# Patient Record
Sex: FEMALE | Race: WHITE | NOT HISPANIC OR LATINO | URBAN - METROPOLITAN AREA
[De-identification: names, ages, dates, MRNs, and addresses within clinical notes are randomized per-mention and may not be internally consistent; named-entity substitution may affect disease eponyms.]

---

## 2019-01-02 ENCOUNTER — EMERGENCY (EMERGENCY)
Facility: HOSPITAL | Age: 84
LOS: 1 days | Discharge: HOME | End: 2019-01-02
Attending: EMERGENCY MEDICINE
Payer: MEDICARE

## 2019-01-02 VITALS
RESPIRATION RATE: 20 BRPM | SYSTOLIC BLOOD PRESSURE: 191 MMHG | DIASTOLIC BLOOD PRESSURE: 71 MMHG | WEIGHT: 136.91 LBS | OXYGEN SATURATION: 97 % | TEMPERATURE: 98 F | HEART RATE: 61 BPM

## 2019-01-02 DIAGNOSIS — Z98.89 OTHER SPECIFIED POSTPROCEDURAL STATES: Chronic | ICD-10-CM

## 2019-01-02 DIAGNOSIS — Z90.710 ACQUIRED ABSENCE OF BOTH CERVIX AND UTERUS: Chronic | ICD-10-CM

## 2019-01-02 DIAGNOSIS — Z95.1 PRESENCE OF AORTOCORONARY BYPASS GRAFT: Chronic | ICD-10-CM

## 2019-01-02 PROCEDURE — 70450 CT HEAD/BRAIN W/O DYE: CPT | Mod: 26

## 2019-01-02 PROCEDURE — 99284 EMERGENCY DEPT VISIT MOD MDM: CPT | Mod: 25

## 2019-01-02 PROCEDURE — 99284 EMERGENCY DEPT VISIT MOD MDM: CPT

## 2019-01-02 PROCEDURE — 70450 CT HEAD/BRAIN W/O DYE: CPT

## 2019-01-02 NOTE — ED PROVIDER NOTE - MEDICAL DECISION MAKING DETAILS
take home bp meds ambulating in nad return to ed for intractable HA, persistent vomiting, or new onset motor/sensory deficits

## 2019-01-02 NOTE — ED ADULT TRIAGE NOTE - CHIEF COMPLAINT QUOTE
"I was carrying in shopping bags and I tripped and fell. " Pt is on a baby ASA, has swelling and bruising to right forehead. Pt states she feels nauseous. Pt to be evaluated for priority CT "I was carrying in shopping bags and I tripped and fell. " Pt is on a baby ASA, has swelling and bruising to right forehead. denies LOC, no other obvious signs of trauma noted Pt states she feels nauseous. Pt to be evaluated for priority CT vs trauma

## 2019-01-02 NOTE — ED PROVIDER NOTE - OBJECTIVE STATEMENT
83 y/o F pt with hx of CAD, CABG x3, DM, HTN, hypothyroidism, appendectomy, and hysterectomy presents to ED c/o swelling and bruising to her forehead s/p fall that occurred today. Pt was carrying grocery bags when she tripped and fell, hitting her head on the floor. Pt takes ASA 81 mg daily. denies LOC. denies fever. denies HA or neck pain. no chest pain or sob. no abd pain. no n/v/d. no urinary f/u/d. no back pain. no motor or sensory deficits. denies illicit drug use. no recent travel. no rash. no other acute issues symptoms or concerns

## 2019-01-02 NOTE — ED STATDOCS - PROGRESS NOTE DETAILS
85 y/o F pt presents to the ED c/o head pain and knee pain s/p falling today.  Pt was walking holding shopping bags when she tripped and fell down to the ground, hitting her head.  Pt has a bruise/bump on her head and reports R knee pain.  Pt takes baby ASA daily.  Denies LOC, dizziness, N/V.  Exam: hematoma to L forehead with abrasion  Pt will be sent to the Main ED for further treatment and evaluation. Initial orders placed. Priority CT ordered.

## 2020-10-31 ENCOUNTER — TRANSCRIPTION ENCOUNTER (OUTPATIENT)
Age: 85
End: 2020-10-31

## 2020-12-08 ENCOUNTER — APPOINTMENT (OUTPATIENT)
Dept: DERMATOLOGY | Facility: CLINIC | Age: 85
End: 2020-12-08
Payer: MEDICARE

## 2020-12-08 DIAGNOSIS — L82.0 INFLAMED SEBORRHEIC KERATOSIS: ICD-10-CM

## 2020-12-08 PROCEDURE — 17110 DESTRUCTION B9 LES UP TO 14: CPT

## 2020-12-08 RX ORDER — AMOXICILLIN AND CLAVULANATE POTASSIUM 875; 125 MG/1; MG/1
875-125 TABLET, COATED ORAL
Qty: 20 | Refills: 0 | Status: DISCONTINUED | COMMUNITY
Start: 2020-07-13

## 2020-12-08 NOTE — HISTORY OF PRESENT ILLNESS
[FreeTextEntry1] : Growth on left forehead [de-identified] : First visit for 86-year-old white female presenting with a tender lesion on the left upper lateral forehead. No history of skin cancer.

## 2020-12-08 NOTE — PHYSICAL EXAM
[Alert] : alert [Oriented x 3] : ~L oriented x 3 [Well Nourished] : well nourished [FreeTextEntry3] : Type I skin\par \par Patient wearing a facemask\par \par Patient declines full skin exam today\par \par Left upper lateral forehead: 12 x 12 mm pink/tan verrucous plaque

## 2020-12-13 ENCOUNTER — TRANSCRIPTION ENCOUNTER (OUTPATIENT)
Age: 85
End: 2020-12-13

## 2020-12-15 ENCOUNTER — NON-APPOINTMENT (OUTPATIENT)
Age: 85
End: 2020-12-15

## 2020-12-15 LAB — CORE LAB BIOPSY: NORMAL

## 2021-01-07 ENCOUNTER — APPOINTMENT (OUTPATIENT)
Dept: DERMATOLOGY | Facility: CLINIC | Age: 86
End: 2021-01-07
Payer: MEDICARE

## 2021-01-07 DIAGNOSIS — Z85.828 PERSONAL HISTORY OF OTHER MALIGNANT NEOPLASM OF SKIN: ICD-10-CM

## 2021-01-07 DIAGNOSIS — C44.41 BASAL CELL CARCINOMA OF SKIN OF SCALP AND NECK: ICD-10-CM

## 2021-01-07 PROCEDURE — 17272 DSTR MAL LES S/N/H/F/G 1.1-2: CPT

## 2021-01-07 PROCEDURE — 99072 ADDL SUPL MATRL&STAF TM PHE: CPT

## 2021-01-07 RX ORDER — ASPIRIN 81 MG
81 TABLET, DELAYED RELEASE (ENTERIC COATED) ORAL
Refills: 0 | Status: ACTIVE | COMMUNITY

## 2021-01-13 ENCOUNTER — NON-APPOINTMENT (OUTPATIENT)
Age: 86
End: 2021-01-13

## 2021-01-13 LAB — CORE LAB BIOPSY: NORMAL

## 2021-01-13 NOTE — PHYSICAL EXAM
[FreeTextEntry3] : Patient wearing a facemask\par \par Left upper neck: 14 x 6 mm bilobed focally crusted translucent plaque

## 2021-01-13 NOTE — HISTORY OF PRESENT ILLNESS
[FreeTextEntry1] : Lesion on left neck [de-identified] : Followup visit for 86-year-old white female seen by me on December 8, 2020, for evaluation of a lesion on the left neck. No history of skin cancer.

## 2022-12-04 ENCOUNTER — OFFICE (OUTPATIENT)
Dept: URBAN - METROPOLITAN AREA CLINIC 12 | Facility: CLINIC | Age: 87
Setting detail: OPHTHALMOLOGY
End: 2022-12-04
Payer: MEDICARE

## 2022-12-04 DIAGNOSIS — Z96.1: ICD-10-CM

## 2022-12-04 DIAGNOSIS — E11.9: ICD-10-CM

## 2022-12-04 DIAGNOSIS — H40.013: ICD-10-CM

## 2022-12-04 DIAGNOSIS — H43.393: ICD-10-CM

## 2022-12-04 DIAGNOSIS — H35.3131: ICD-10-CM

## 2022-12-04 DIAGNOSIS — H26.493: ICD-10-CM

## 2022-12-04 DIAGNOSIS — H04.123: ICD-10-CM

## 2022-12-04 PROCEDURE — 92083 EXTENDED VISUAL FIELD XM: CPT | Performed by: OPHTHALMOLOGY

## 2022-12-04 PROCEDURE — 92134 CPTRZ OPH DX IMG PST SGM RTA: CPT | Performed by: OPHTHALMOLOGY

## 2022-12-04 PROCEDURE — 99213 OFFICE O/P EST LOW 20 MIN: CPT | Performed by: OPHTHALMOLOGY

## 2022-12-04 ASSESSMENT — REFRACTION_AUTOREFRACTION
OS_CYLINDER: -1.00
OD_CYLINDER: -0.25
OS_AXIS: 103
OS_SPHERE: -0.25
OD_AXIS: 051
OD_SPHERE: +0.25

## 2022-12-04 ASSESSMENT — REFRACTION_MANIFEST
OS_AXIS: 105
OS_VA1: 20/25
OD_CYLINDER: -0.25
OS_SPHERE: -0.25
OD_VA1: 20/20-1
OD_AXIS: 050
OS_CYLINDER: -1.00
OD_SPHERE: +0.25

## 2022-12-04 ASSESSMENT — AXIALLENGTH_DERIVED
OD_AL: 23.8209
OS_AL: 24.1255
OS_AL: 24.1255
OD_AL: 23.8209

## 2022-12-04 ASSESSMENT — REFRACTION_CURRENTRX
OD_VPRISM_DIRECTION: SV
OD_SPHERE: +2.50
OS_SPHERE: +2.50
OD_OVR_VA: 20/
OS_VPRISM_DIRECTION: SV
OS_OVR_VA: 20/

## 2022-12-04 ASSESSMENT — TEAR BREAK UP TIME (TBUT)
OD_TBUT: 1+
OS_TBUT: T

## 2022-12-04 ASSESSMENT — VISUAL ACUITY
OD_BCVA: 20/40-1
OS_BCVA: 20/20-2

## 2022-12-04 ASSESSMENT — SPHEQUIV_DERIVED
OS_SPHEQUIV: -0.75
OS_SPHEQUIV: -0.75
OD_SPHEQUIV: 0.125
OD_SPHEQUIV: 0.125

## 2022-12-04 ASSESSMENT — KERATOMETRY
OS_K2POWER_DIOPTERS: 43.25
OD_K1POWER_DIOPTERS: 42.50
METHOD_AUTO_MANUAL: AUTO
OS_AXISANGLE_DEGREES: 025
OS_K1POWER_DIOPTERS: 42.50
OD_AXISANGLE_DEGREES: 099
OD_K2POWER_DIOPTERS: 43.00

## 2022-12-04 ASSESSMENT — TONOMETRY
OS_IOP_MMHG: 17
OD_IOP_MMHG: 16

## 2022-12-04 ASSESSMENT — DECREASING TEAR LAKE - SEVERITY SCORE
OS_DEC_TEARLAKE: T
OD_DEC_TEARLAKE: T

## 2022-12-04 ASSESSMENT — LID POSITION - COMMENTS
OD_COMMENTS: INCISION INTACT
OS_COMMENTS: INCISION INTACT

## 2022-12-04 ASSESSMENT — CONFRONTATIONAL VISUAL FIELD TEST (CVF)
OS_FINDINGS: FULL
OD_FINDINGS: FULL

## 2022-12-30 ENCOUNTER — APPOINTMENT (OUTPATIENT)
Dept: GASTROENTEROLOGY | Facility: CLINIC | Age: 87
End: 2022-12-30

## 2023-02-20 ENCOUNTER — EMERGENCY (EMERGENCY)
Facility: HOSPITAL | Age: 88
LOS: 1 days | Discharge: HOME | End: 2023-02-20
Attending: EMERGENCY MEDICINE
Payer: MEDICARE

## 2023-02-20 VITALS
TEMPERATURE: 98 F | DIASTOLIC BLOOD PRESSURE: 77 MMHG | RESPIRATION RATE: 20 BRPM | HEART RATE: 61 BPM | OXYGEN SATURATION: 96 % | WEIGHT: 126.1 LBS | HEIGHT: 62 IN | SYSTOLIC BLOOD PRESSURE: 181 MMHG

## 2023-02-20 DIAGNOSIS — Z95.1 PRESENCE OF AORTOCORONARY BYPASS GRAFT: Chronic | ICD-10-CM

## 2023-02-20 DIAGNOSIS — Z90.710 ACQUIRED ABSENCE OF BOTH CERVIX AND UTERUS: Chronic | ICD-10-CM

## 2023-02-20 DIAGNOSIS — Z98.89 OTHER SPECIFIED POSTPROCEDURAL STATES: Chronic | ICD-10-CM

## 2023-02-20 PROCEDURE — 99284 EMERGENCY DEPT VISIT MOD MDM: CPT

## 2023-02-20 PROCEDURE — 99283 EMERGENCY DEPT VISIT LOW MDM: CPT

## 2023-02-20 PROCEDURE — 96372 THER/PROPH/DIAG INJ SC/IM: CPT

## 2023-02-20 RX ORDER — KETOROLAC TROMETHAMINE 30 MG/ML
30 SYRINGE (ML) INJECTION ONCE
Refills: 0 | Status: DISCONTINUED | OUTPATIENT
Start: 2023-02-20 | End: 2023-02-20

## 2023-02-20 RX ORDER — ACETAMINOPHEN 500 MG
975 TABLET ORAL ONCE
Refills: 0 | Status: COMPLETED | OUTPATIENT
Start: 2023-02-20 | End: 2023-02-20

## 2023-02-20 RX ORDER — LIDOCAINE 4 G/100G
1 CREAM TOPICAL ONCE
Refills: 0 | Status: COMPLETED | OUTPATIENT
Start: 2023-02-20 | End: 2023-02-20

## 2023-02-20 RX ADMIN — Medication 30 MILLIGRAM(S): at 10:55

## 2023-02-20 RX ADMIN — Medication 975 MILLIGRAM(S): at 10:55

## 2023-02-20 RX ADMIN — LIDOCAINE 1 PATCH: 4 CREAM TOPICAL at 10:55

## 2023-02-20 NOTE — ED STATDOCS - CLINICAL SUMMARY MEDICAL DECISION MAKING FREE TEXT BOX
Pt presenting for knee pain, likely bursitis. Less likely gout or septic arthritis give absents of infectious symptoms on history or exam. Will control pain and wrap.

## 2023-02-20 NOTE — ED ADULT NURSE NOTE - OBJECTIVE STATEMENT
pt a+ox3, reports right knee pain worsening over past week. pt reports difficulty ambulating. denies any recent falls or trauma.

## 2023-02-20 NOTE — ED PROVIDER NOTE - PATIENT PORTAL LINK FT
You can access the FollowMyHealth Patient Portal offered by [facility] by registering at the following website: [email or web address]. By joining [employer]’s FollowMyHealth portal, you will also be able to view your health information using other applications (apps) compatible with our system.

## 2023-02-20 NOTE — ED PROVIDER NOTE - PHYSICAL EXAMINATION
General: well appearing, NAD  Head: NC, AT  EENT: EOMI, no scleral icterus  Cardiac: RRR, no apparent murmurs, no lower extremity edema  Respiratory: CTABL, no respiratory distress   Abdomen: soft, ND, NT, nonperitonitic  MSK/Vascular: full ROM, soft compartments, warm extremities, swelling inferior and lateral to patella, no redness, mildly tender, middle rom without pain but ROM at extremes reduced 2/2 pain.  Neuro: AAOx3, sensation to light touch intact  Psych: calm, cooperative

## 2023-02-20 NOTE — ED STATDOCS - OBJECTIVE STATEMENT
88 y f with pmh of gout, CAD sp cabg, htn, hld, COPD, hypothyroidism presenting for knee pain for 1 week. No trauma. Pt able to walk but needs assistance. Does not feel like gout flare and is usually in toes. Reduced ROM 2/2 pain at extremes. Pt denies f/c, n/c, ab pain, cp, sob, dysuria.

## 2023-02-20 NOTE — ED STATDOCS - NS ED ROS FT
Constitutional: no fever, no chills  Head: NC, AT   Eyes: no redness   ENMT: no nasal congestion/drainage, no sore throat   CV: no chest pain, no edema  Resp: no cough, no dyspnea  GI: no abdominal pain, no nausea, no vomiting, no diarrhea  : no dysuria, no hematuria   Skin: no lesions, no rashes   Neuro: no LOC, no headache, no sensory deficits, no weakness  MSK: Right knee pain

## 2023-02-20 NOTE — ED ADULT NURSE NOTE - NSICDXFAMILYHX_GEN_ALL_CORE_FT
FAMILY HISTORY:  Sibling  Still living? Unknown  Family history of myocardial infarction, Age at diagnosis: Age Unknown

## 2023-02-20 NOTE — ED ADULT NURSE NOTE - NSICDXPASTSURGICALHX_GEN_ALL_CORE_FT
PAST SURGICAL HISTORY:  S/P appendectomy     S/P hysterectomy     S/P three vessel coronary artery bypass

## 2023-02-20 NOTE — ED PROVIDER NOTE - NSFOLLOWUPINSTRUCTIONS_ED_ALL_ED_FT
Take tylenol and ibuprofen for the pain.  Follow up with the orthopedic surgeon provided.  Come back for fever, chills, worsening pain, redness or increased swelling at knee.    Bursitis       Bursitis is inflammation and irritation of a bursa, which is one of the small, fluid-filled sacs that cushion and protect the moving parts of your body. These sacs are located between bones and muscles, bones and muscle attachments, or bones and skin areas that are next to bones. A bursa protects those structures from the wear and tear that results from frequent movement.    An inflamed bursa causes pain and swelling. Fluid may build up inside the sac. Bursitis is most common near joints, especially the knees, elbows, hips, and shoulders.      What are the causes?    This condition may be caused by:•Injury from:  •A direct hit (blow), like falling on your knee or elbow.      •Overuse of a joint (repetitive stress).        •Infection. This can happen if bacteria get into a bursa through a cut or scrape near a joint.      •Diseases that cause joint inflammation, such as gout and rheumatoid arthritis.        What increases the risk?    You are more likely to develop this condition if you:  •Have a job or hobby that involves a lot of repetitive stress on your joints.      •Have a condition that weakens your body's defense system (immune system), such as diabetes, cancer, or HIV.    •Do any of these often:  •Lift and reach overhead.      •Kneel or lean on hard surfaces.      •Run or walk.          What are the signs or symptoms?    The most common symptoms of this condition include:  •Pain that gets worse when you move the affected body part or use it to support (bear) your body weight.      •Inflammation.      •Stiffness.      Other symptoms include:  •Redness.      •Swelling.      •Tenderness.      •Warmth.      •Pain that continues after rest.      •Fever or chills. These may occur in bursitis that is caused by infection.        How is this diagnosed?    This condition may be diagnosed based on:  •Your medical history and a physical exam.      •Imaging tests, such as an MRI.      •A procedure to drain fluid from the bursa with a needle (aspiration). The fluid may be checked for signs of infection or gout.      •Blood tests to rule out other causes of inflammation.        How is this treated?    This condition can usually be treated at home with rest, ice, applying pressure (compression), and raising the body part that is affected (elevation). This is called RICE therapy. For mild bursitis, RICE therapy may be all you need. Other treatments may include:  •NSAIDs to treat pain and inflammation.      •Corticosteroid medicines to fight inflammation. These medicines may be injected into and around the area of bursitis.      •Aspiration of fluid from the bursa to relieve pain and improve movement.      •Antibiotic medicine to treat an infected bursa.      •A splint, brace, or walking aid, such as a cane.      •Physical therapy if you continue to have pain or limited movement.      •Surgery to remove a damaged or infected bursa. This may be needed if other treatments have not worked.        Follow these instructions at home:    Medicines     •Take over-the-counter and prescription medicines only as told by your health care provider.      •If you were prescribed an antibiotic medicine, take it as told by your health care provider. Do not stop taking the antibiotic even if you start to feel better.        General instructions    •Rest the affected area as told by your health care provider.  •If possible, raise (elevate) the affected area above the level of your heart while you are sitting or lying down.      •Avoid activities that make pain worse.        •Use splints, braces, pads, or walking aids as told by your health care provider.    •If directed, put ice on the affected area:  •If you have a removable splint or brace, remove it as told by your health care provider.      •Put ice in a plastic bag.      •Place a towel between your skin and the bag or between your splint or brace and the bag.      •Leave the ice on for 20 minutes, 2–3 times a day.        •Keep all follow-up visits as told by your health care provider. This is important.      Preventing future episodes     Take actions to help prevent future episodes of bursitis.  •Wear knee pads if you kneel often.      •Wear sturdy running or walking shoes that fit you well.      •Take breaks regularly from repetitive activity.      •Warm up by stretching before doing any activity that takes a lot of effort.      •Maintain a healthy weight or lose weight as recommended by your health care provider. If you need help doing this, ask your health care provider.      •Exercise regularly. Start any new physical activity gradually.        Contact a health care provider if you:    •Have a fever.      •Have chills.      •Have bursitis that is not getting better with treatment or home care.        Summary    •Bursitis is inflammation and irritation of a bursa, which is one of the small, fluid-filled sacs that cushion and protect the moving parts of your body.      •An inflamed bursa causes pain and swelling.      •Bursitis is commonly diagnosed with a physical exam, but other tests are sometimes needed.      •This condition can usually be treated at home with rest, ice, applying pressure (compression), and raising the body part that is affected (elevation). This is called RICE therapy.      This information is not intended to replace advice given to you by your health care provider. Make sure you discuss any questions you have with your health care provider.

## 2023-02-20 NOTE — ED ADULT NURSE NOTE - NSICDXPASTMEDICALHX_GEN_ALL_CORE_FT
PAST MEDICAL HISTORY:  CAD (coronary artery disease)     Diabetes     Hypertension     Hypothyroid

## 2023-02-20 NOTE — ED PROVIDER NOTE - ATTENDING CONTRIBUTION TO CARE
88-year-old female; with past medical history significant for CAD, hypertension, hyperlipidemia,; now presenting with right knee pain and swelling x10 days.  Denies fevers chills or sweats.  Denies calf pain.  Denies chest pain shortness of breath or palpitations.  Denies trauma.  Denies excessive activity.  Exam:  General:     NAD  Head:     NC/AT, EOMI,  Neck:     trachea midline  Lungs:     CTA b/l, no w/r/r  CVS:     S1S2, RRR, no m/g/r  Ext:    L LE: from/nt @ hip/knee/ankle.  R LE:  from/nt @ hip/ankle. ttp @ infero-lateral knee over bursa, small subpatellar effusion.    A/P: 88-year-old female now presenting with atraumatic right knee pain.  -xray, pain control, compression, f/up with ortho

## 2023-02-20 NOTE — ED PROVIDER NOTE - CLINICAL SUMMARY MEDICAL DECISION MAKING FREE TEXT BOX
Pt presenting for knee pain, likely bursitis. Less likely gout or septic arthritis give absents of infectious symptoms on history or exam. Will control pain and wrap.    Pt reassessed after meds and ace bandage. Pain is greatly improved and pt able to walk without assistance at pt's baseline. Offered cane. Pt says that she has one at home. Encouraged to use it to avoid fall and worse injury.

## 2023-11-13 ENCOUNTER — EMERGENCY (EMERGENCY)
Facility: HOSPITAL | Age: 88
LOS: 1 days | Discharge: HOME | End: 2023-11-13
Attending: EMERGENCY MEDICINE
Payer: MEDICARE

## 2023-11-13 VITALS
TEMPERATURE: 98 F | SYSTOLIC BLOOD PRESSURE: 177 MMHG | DIASTOLIC BLOOD PRESSURE: 67 MMHG | WEIGHT: 124.12 LBS | HEIGHT: 60 IN | RESPIRATION RATE: 20 BRPM | OXYGEN SATURATION: 98 % | HEART RATE: 66 BPM

## 2023-11-13 DIAGNOSIS — Z90.710 ACQUIRED ABSENCE OF BOTH CERVIX AND UTERUS: Chronic | ICD-10-CM

## 2023-11-13 DIAGNOSIS — Z95.1 PRESENCE OF AORTOCORONARY BYPASS GRAFT: Chronic | ICD-10-CM

## 2023-11-13 DIAGNOSIS — Z98.89 OTHER SPECIFIED POSTPROCEDURAL STATES: Chronic | ICD-10-CM

## 2023-11-13 PROCEDURE — 99284 EMERGENCY DEPT VISIT MOD MDM: CPT | Mod: 25

## 2023-11-13 PROCEDURE — 99284 EMERGENCY DEPT VISIT MOD MDM: CPT

## 2023-11-13 PROCEDURE — 73630 X-RAY EXAM OF FOOT: CPT

## 2023-11-13 PROCEDURE — 73610 X-RAY EXAM OF ANKLE: CPT

## 2023-11-13 PROCEDURE — 29505 APPLICATION LONG LEG SPLINT: CPT | Mod: RT

## 2023-11-13 PROCEDURE — 73630 X-RAY EXAM OF FOOT: CPT | Mod: 26,RT

## 2023-11-13 PROCEDURE — 73610 X-RAY EXAM OF ANKLE: CPT | Mod: 26,RT

## 2023-11-13 NOTE — ED PROVIDER NOTE - CLINICAL SUMMARY MEDICAL DECISION MAKING FREE TEXT BOX
89-year-old female presents to the ED complaining of right ankle/foot pain that she woke up with this morning. Patient x-rays reviewed–no acute fractures or dislocations.  There was a questionable lucency at the fifth metatarsal head but clinically patient has absolutely no tenderness to palpation over that region.  Tenderness is localized to the lateral malleolus.  Likely sprain, patient placed in ankle Velcro splint and given orthopedic follow-up.  Stable for discharge.

## 2023-11-13 NOTE — ED PROVIDER NOTE - PATIENT PORTAL LINK FT
You can access the FollowMyHealth Patient Portal offered by [facility] by registering at the following website: [email or web address]. By joining [employer]’s FollowMyHealth portal, you will also be able to view your health information using other applications (apps) compatible with our system.

## 2023-11-13 NOTE — ED PROVIDER NOTE - MUSCULOSKELETAL, MLM
+ TTP of the right lateral malleolus/forefoot with mild localized swelling noted. 2+ DP pulse. SILT. +elh/fhl/plantar/dorsiflexion intact. No calf TTP. + pain elicited with ankle passive inversion.

## 2023-11-13 NOTE — ED PROVIDER NOTE - CARE PROVIDER_API CALL
Emigdio Isaac  Orthopaedic Surgery  [address] [place] [id number]  Phone: [phone]  Fax: [phone]  Follow Up Time:

## 2023-11-13 NOTE — ED PROVIDER NOTE - OBJECTIVE STATEMENT
89-year-old female presents to the ED complaining of right ankle/foot pain that she woke up with this morning.  Patient states that she had a "burning/tingling "sensation of her ankle. Pt otherwise denies recent trauma/injury, fever/chills, c/p, sob, abd pain, n/v/c/d, dysuria, numbness/tingling/weakness and has no other complaints at this time.

## 2023-11-13 NOTE — ED PROVIDER NOTE - NS ED ATTENDING STATEMENT MOD
This was a shared visit with the HORACIO. I reviewed and verified the documentation and independently performed the documented:

## 2023-11-13 NOTE — ED ADULT NURSE NOTE - OBJECTIVE STATEMENT
pt rpeorts to the ED c/o of R foot numbness that started this morning. pt a&ox3 denies trama or injury tot he foot state she just woke up and it felt numb. RR wnl.

## 2023-11-13 NOTE — ED PROVIDER NOTE - ATTENDING APP SHARED VISIT CONTRIBUTION OF CARE
89 F complaining of right ankle pain, no history of trauma.  On exam mild tenderness over the volar aspect anterior lateral, no significant swelling or redness neurovascular intact.  X-rays show no evidence of acute abnormality.  Fitted in surgical shoe outpatient follow-up.  Likely ligamental injury.

## 2023-12-16 ENCOUNTER — OFFICE (OUTPATIENT)
Dept: URBAN - METROPOLITAN AREA CLINIC 12 | Facility: CLINIC | Age: 88
Setting detail: OPHTHALMOLOGY
End: 2023-12-16
Payer: MEDICARE

## 2023-12-16 DIAGNOSIS — H40.013: ICD-10-CM

## 2023-12-16 DIAGNOSIS — H43.393: ICD-10-CM

## 2023-12-16 DIAGNOSIS — H35.3131: ICD-10-CM

## 2023-12-16 DIAGNOSIS — E11.9: ICD-10-CM

## 2023-12-16 PROBLEM — H16.223 DRY EYE SYNDROME K SICCA; BOTH EYES: Status: ACTIVE | Noted: 2023-12-16

## 2023-12-16 PROCEDURE — 92014 COMPRE OPH EXAM EST PT 1/>: CPT | Performed by: OPHTHALMOLOGY

## 2023-12-16 PROCEDURE — 92250 FUNDUS PHOTOGRAPHY W/I&R: CPT | Performed by: OPHTHALMOLOGY

## 2023-12-16 ASSESSMENT — DECREASING TEAR LAKE - SEVERITY SCORE
OS_DEC_TEARLAKE: T
OD_DEC_TEARLAKE: T

## 2023-12-16 ASSESSMENT — REFRACTION_MANIFEST
OS_AXIS: 125
OS_CYLINDER: -1.00
OS_VA1: 20/25
OD_AXIS: 050
OD_VA1: 20/NI
OD_VA1: 20/20-1
OD_SPHERE: +0.25
OS_SPHERE: -1.00
OD_CYLINDER: -0.25
OS_AXIS: 105
OD_AXIS: 025
OS_CYLINDER: -0.50
OS_VA1: 20/40-2
OS_SPHERE: -0.25
OD_CYLINDER: -0.25
OD_SPHERE: +0.25

## 2023-12-16 ASSESSMENT — REFRACTION_AUTOREFRACTION
OS_AXIS: 127
OS_SPHERE: -1.00
OD_AXIS: 023
OD_CYLINDER: -0.25
OS_CYLINDER: -0.50
OD_SPHERE: +0.25

## 2023-12-16 ASSESSMENT — REFRACTION_CURRENTRX
OS_SPHERE: +2.50
OS_VPRISM_DIRECTION: SV
OD_OVR_VA: 20/
OD_VPRISM_DIRECTION: SV
OS_OVR_VA: 20/
OD_SPHERE: +2.50

## 2023-12-16 ASSESSMENT — SPHEQUIV_DERIVED
OD_SPHEQUIV: 0.125
OS_SPHEQUIV: -1.25
OD_SPHEQUIV: 0.125
OS_SPHEQUIV: -1.25
OD_SPHEQUIV: 0.125
OS_SPHEQUIV: -0.75

## 2023-12-16 ASSESSMENT — LID POSITION - COMMENTS
OD_COMMENTS: INCISION INTACT
OS_COMMENTS: INCISION INTACT

## 2023-12-16 ASSESSMENT — CONFRONTATIONAL VISUAL FIELD TEST (CVF)
OS_FINDINGS: FULL
OD_FINDINGS: FULL

## 2023-12-16 ASSESSMENT — TEAR BREAK UP TIME (TBUT)
OS_TBUT: T
OD_TBUT: 1+

## 2024-01-23 ENCOUNTER — EMERGENCY (EMERGENCY)
Facility: HOSPITAL | Age: 89
LOS: 1 days | Discharge: HOME | End: 2024-01-23
Attending: STUDENT IN AN ORGANIZED HEALTH CARE EDUCATION/TRAINING PROGRAM
Payer: MEDICARE

## 2024-01-23 VITALS
OXYGEN SATURATION: 96 % | DIASTOLIC BLOOD PRESSURE: 66 MMHG | RESPIRATION RATE: 18 BRPM | HEART RATE: 59 BPM | WEIGHT: 119.93 LBS | HEIGHT: 60 IN | TEMPERATURE: 98 F | SYSTOLIC BLOOD PRESSURE: 190 MMHG

## 2024-01-23 VITALS — SYSTOLIC BLOOD PRESSURE: 160 MMHG | DIASTOLIC BLOOD PRESSURE: 70 MMHG

## 2024-01-23 DIAGNOSIS — Z98.89 OTHER SPECIFIED POSTPROCEDURAL STATES: Chronic | ICD-10-CM

## 2024-01-23 DIAGNOSIS — Z90.710 ACQUIRED ABSENCE OF BOTH CERVIX AND UTERUS: Chronic | ICD-10-CM

## 2024-01-23 DIAGNOSIS — Z95.1 PRESENCE OF AORTOCORONARY BYPASS GRAFT: Chronic | ICD-10-CM

## 2024-01-23 LAB
ALBUMIN SERPL ELPH-MCNC: 3.9 G/DL — SIGNIFICANT CHANGE UP (ref 3.3–5.2)
ALP SERPL-CCNC: 61 U/L — SIGNIFICANT CHANGE UP (ref 40–120)
ALT FLD-CCNC: 12 U/L — SIGNIFICANT CHANGE UP
ANION GAP SERPL CALC-SCNC: 13 MMOL/L — SIGNIFICANT CHANGE UP (ref 5–17)
APTT BLD: 29.1 SEC — SIGNIFICANT CHANGE UP (ref 24.5–35.6)
AST SERPL-CCNC: 20 U/L — SIGNIFICANT CHANGE UP
BASOPHILS # BLD AUTO: 0.05 K/UL — SIGNIFICANT CHANGE UP (ref 0–0.2)
BASOPHILS NFR BLD AUTO: 0.6 % — SIGNIFICANT CHANGE UP (ref 0–2)
BILIRUB SERPL-MCNC: 0.3 MG/DL — LOW (ref 0.4–2)
BUN SERPL-MCNC: 34.3 MG/DL — HIGH (ref 8–20)
CALCIUM SERPL-MCNC: 9.7 MG/DL — SIGNIFICANT CHANGE UP (ref 8.4–10.5)
CHLORIDE SERPL-SCNC: 104 MMOL/L — SIGNIFICANT CHANGE UP (ref 96–108)
CO2 SERPL-SCNC: 21 MMOL/L — LOW (ref 22–29)
CREAT SERPL-MCNC: 1.59 MG/DL — HIGH (ref 0.5–1.3)
EGFR: 31 ML/MIN/1.73M2 — LOW
EOSINOPHIL # BLD AUTO: 0.91 K/UL — HIGH (ref 0–0.5)
EOSINOPHIL NFR BLD AUTO: 10.8 % — HIGH (ref 0–6)
GLUCOSE SERPL-MCNC: 104 MG/DL — HIGH (ref 70–99)
HCT VFR BLD CALC: 35.8 % — SIGNIFICANT CHANGE UP (ref 34.5–45)
HGB BLD-MCNC: 12 G/DL — SIGNIFICANT CHANGE UP (ref 11.5–15.5)
IMM GRANULOCYTES NFR BLD AUTO: 0.1 % — SIGNIFICANT CHANGE UP (ref 0–0.9)
INR BLD: 0.93 RATIO — SIGNIFICANT CHANGE UP (ref 0.85–1.18)
LYMPHOCYTES # BLD AUTO: 2.53 K/UL — SIGNIFICANT CHANGE UP (ref 1–3.3)
LYMPHOCYTES # BLD AUTO: 29.9 % — SIGNIFICANT CHANGE UP (ref 13–44)
MCHC RBC-ENTMCNC: 31.1 PG — SIGNIFICANT CHANGE UP (ref 27–34)
MCHC RBC-ENTMCNC: 33.5 GM/DL — SIGNIFICANT CHANGE UP (ref 32–36)
MCV RBC AUTO: 92.7 FL — SIGNIFICANT CHANGE UP (ref 80–100)
MONOCYTES # BLD AUTO: 0.77 K/UL — SIGNIFICANT CHANGE UP (ref 0–0.9)
MONOCYTES NFR BLD AUTO: 9.1 % — SIGNIFICANT CHANGE UP (ref 2–14)
NEUTROPHILS # BLD AUTO: 4.18 K/UL — SIGNIFICANT CHANGE UP (ref 1.8–7.4)
NEUTROPHILS NFR BLD AUTO: 49.5 % — SIGNIFICANT CHANGE UP (ref 43–77)
PLATELET # BLD AUTO: 277 K/UL — SIGNIFICANT CHANGE UP (ref 150–400)
POTASSIUM SERPL-MCNC: 4.1 MMOL/L — SIGNIFICANT CHANGE UP (ref 3.5–5.3)
POTASSIUM SERPL-SCNC: 4.1 MMOL/L — SIGNIFICANT CHANGE UP (ref 3.5–5.3)
PROT SERPL-MCNC: 6.4 G/DL — LOW (ref 6.6–8.7)
PROTHROM AB SERPL-ACNC: 10.3 SEC — SIGNIFICANT CHANGE UP (ref 9.5–13)
RBC # BLD: 3.86 M/UL — SIGNIFICANT CHANGE UP (ref 3.8–5.2)
RBC # FLD: 13.3 % — SIGNIFICANT CHANGE UP (ref 10.3–14.5)
SODIUM SERPL-SCNC: 138 MMOL/L — SIGNIFICANT CHANGE UP (ref 135–145)
TROPONIN T, HIGH SENSITIVITY RESULT: 16 NG/L — SIGNIFICANT CHANGE UP (ref 0–51)
TROPONIN T, HIGH SENSITIVITY RESULT: 17 NG/L — SIGNIFICANT CHANGE UP (ref 0–51)
WBC # BLD: 8.45 K/UL — SIGNIFICANT CHANGE UP (ref 3.8–10.5)
WBC # FLD AUTO: 8.45 K/UL — SIGNIFICANT CHANGE UP (ref 3.8–10.5)

## 2024-01-23 PROCEDURE — 85730 THROMBOPLASTIN TIME PARTIAL: CPT

## 2024-01-23 PROCEDURE — 84484 ASSAY OF TROPONIN QUANT: CPT

## 2024-01-23 PROCEDURE — 99285 EMERGENCY DEPT VISIT HI MDM: CPT

## 2024-01-23 PROCEDURE — 80053 COMPREHEN METABOLIC PANEL: CPT

## 2024-01-23 PROCEDURE — 85610 PROTHROMBIN TIME: CPT

## 2024-01-23 PROCEDURE — 85025 COMPLETE CBC W/AUTO DIFF WBC: CPT

## 2024-01-23 PROCEDURE — 93010 ELECTROCARDIOGRAM REPORT: CPT

## 2024-01-23 PROCEDURE — 99285 EMERGENCY DEPT VISIT HI MDM: CPT | Mod: 25

## 2024-01-23 PROCEDURE — 71045 X-RAY EXAM CHEST 1 VIEW: CPT | Mod: 26

## 2024-01-23 PROCEDURE — 36415 COLL VENOUS BLD VENIPUNCTURE: CPT

## 2024-01-23 PROCEDURE — 71045 X-RAY EXAM CHEST 1 VIEW: CPT

## 2024-01-23 PROCEDURE — 93005 ELECTROCARDIOGRAM TRACING: CPT

## 2024-01-23 NOTE — ED PROVIDER NOTE - PATIENT PORTAL LINK FT
You can access the FollowMyHealth Patient Portal offered by [facility] by registering at the following website: [email or web address]. By joining "[employer]’s FollowMyHealth portal, you will also be able to view your health information using other applications (apps) compatible with our system.

## 2024-01-23 NOTE — ED ADULT NURSE NOTE - OBJECTIVE STATEMENT
pt A & Ox4 c/o episode of chest pain that radiated into neck. Respirations even and unlabored. Denies chest pain or SOB. Pt reports chest pain began this afternoon and she told her son who called ambulance. Pt reports triple bypass 30 years ago. denies complaints at this time. CM and  ongoing. IV established. Blood specimens sent to lab. Medications administered as ordered.

## 2024-01-23 NOTE — ED PROVIDER NOTE - NSICDXPASTMEDICALHX_GEN_ALL_CORE_FT
PAST MEDICAL HISTORY:  CAD (coronary artery disease)     Diabetes     Gout     Hypertension     Hypothyroid

## 2024-01-23 NOTE — ED ADULT NURSE NOTE - NSFALLUNIVINTERV_ED_ALL_ED
Bed/Stretcher in lowest position, wheels locked, appropriate side rails in place/Call bell, personal items and telephone in reach/Instruct patient to call for assistance before getting out of bed/chair/stretcher/Non-slip footwear applied when patient is off stretcher/[place] to call system/Physically safe environment - no spills, clutter or unnecessary equipment/Purposeful proactive rounding/Room/bathroom lighting operational, light cord in reach

## 2024-01-23 NOTE — ED PROVIDER NOTE - OBJECTIVE STATEMENT
88 y/o F pt w/ PMHx of CAD s/p CABG presenting w/ mild intermittent chest pain x4 days. Not related to exertion, occasionally wakes her from sleep. L sided and radiates to neck. She denies SOB, back pain, numbness, tingling, focal weakness, abd pain, N/V/D, or any other complaints.

## 2024-01-23 NOTE — ED PROVIDER NOTE - NSFOLLOWUPINSTRUCTIONS_ED_ALL_ED_FT
-Please follow-up with your primary care doctor in the next 2 days.  Please call tomorrow for an appointment.  If you cannot follow-up with your primary care doctor please return to the ED for any urgent issues.  - You were given a copy of the tests performed today.  Please bring the results with you and review them with your primary care doctor.  - If you have any worsening of symptoms or any other concerns please return to the ED immediately.  - Please continue taking your home medications as directed.    Chest Pain    Chest pain can be caused by many different conditions which may or may not be dangerous. Causes include heartburn, lung infections, heart attack, blood clot in lungs, skin infections, strain or damage to muscle, cartilage, or bones, etc. In addition to a history and physical examination, an electrocardiogram (ECG) or other lab tests may have been performed to determine the cause of your chest pain. Follow up with your primary care provider or with a cardiologist as instructed.     SEEK IMMEDIATE MEDICAL CARE IF YOU HAVE ANY OF THE FOLLOWING SYMPTOMS: worsening chest pain, coughing up blood, unexplained back/neck/jaw pain, severe abdominal pain, dizziness or lightheadedness, fainting, shortness of breath, sweaty or clammy skin, vomiting, or racing heart beat. These symptoms may represent a serious problem that is an emergency. Do not wait to see if the symptoms will go away. Get medical help right away. Call 911 and do not drive yourself to the hospital. 1) Follow up with your heart doctor in 1-2 days  2) Return to the ER for worsening or concerning symptoms    Nonspecific Chest Pain, Adult    Chest pain can be caused by many different conditions. It can be caused by a condition that is life-threatening and requires treatment right away. It can also be caused by something that is not life-threatening. If you have chest pain, it can be hard to know the difference, so it is important to get help right away to make sure that you do not have a serious condition.    Some life-threatening causes of chest pain include:    Heart attack.  A tear in the body's main blood vessel (aortic dissection).  Inflammation around your heart (pericarditis).  A problem in the lungs, such as a blood clot (pulmonary embolism) or a collapsed lung (pneumothorax).    Some non life-threatening causes of chest pain include:    Heartburn.  Anxiety or stress.  Damage to the bones, muscles, and cartilage that make up your chest wall.  Pneumonia or bronchitis.  Shingles infection (varicella-zoster virus).    Chest pain can feel like:    Pain or discomfort on the surface of your chest or deep in your chest.  Crushing, pressure, aching, or squeezing pain.  Burning or tingling.  Dull or sharp pain that is worse when you move, cough, or take a deep breath.  Pain or discomfort that is also felt in your back, neck, jaw, shoulder, or arm, or pain that spreads to any of these areas.    Your chest pain may come and go. It may also be constant. Your health care provider will do lab tests and other studies to find the cause of your pain. Treatment will depend on the cause of your chest pain.    Follow these instructions at home:      Medicines    Take over-the-counter and prescription medicines only as told by your health care provider.  If you were prescribed an antibiotic, take it as told by your health care provider. Do not stop taking the antibiotic even if you start to feel better.        Lifestyle     Rest as directed by your health care provider.  Do not use any products that contain nicotine or tobacco, such as cigarettes and e-cigarettes. If you need help quitting, ask your health care provider.  Do not drink alcohol.  Make healthy lifestyle choices as recommended. These may include:    Getting regular exercise. Ask your health care provider to suggest some activities that are safe for you.  Eating a heart-healthy diet. This includes plenty of fresh fruits and vegetables, whole grains, low-fat (lean) protein, and low-fat dairy products. A dietitian can help you find healthy eating options.  Maintaining a healthy weight.  Managing any other health conditions you have, such as high blood pressure (hypertension) or diabetes.  Reducing stress, such as with yoga or relaxation techniques.        General instructions    Pay attention to any changes in your symptoms. Tell your health care provider about them or any new symptoms.  Avoid any activities that cause chest pain.  Keep all follow-up visits as told by your health care provider. This is important. This includes visits for any further testing if your chest pain does not go away.    Contact a health care provider if:  Your chest pain does not go away.  You feel depressed.  You have a fever.    Get help right away if:  Your chest pain gets worse.  You have a cough that gets worse, or you cough up blood.  You have severe pain in your abdomen.  You faint.  You have sudden, unexplained chest discomfort.  You have sudden, unexplained discomfort in your arms, back, neck, or jaw.  You have shortness of breath at any time.  You suddenly start to sweat, or your skin gets clammy.  You feel nausea or you vomit.  You suddenly feel lightheaded or dizzy.  You have severe weakness, or unexplained weakness or fatigue.  Your heart begins to beat quickly, or it feels like it is skipping beats.    These symptoms may represent a serious problem that is an emergency. Do not wait to see if the symptoms will go away. Get medical help right away. Call your local emergency services (911 in the [place] Do not drive yourself to the hospital.    Summary  Chest pain can be caused by a condition that is serious and requires urgent treatment. It may also be caused by something that is not life-threatening.  If you have chest pain, it is very important to see your health care provider. Your health care provider may do lab tests and other studies to find the cause of your pain.  Follow your health care provider's instructions on taking medicines, making lifestyle changes, and getting emergency treatment if symptoms become worse.  Keep all follow-up visits as told by your health care provider. This includes visits for any further testing if your chest pain does not go away.    ADDITIONAL NOTES AND INSTRUCTIONS    Please follow up with your Primary MD in 24-48 hr.  Seek immediate medical care for any new/worsening signs or symptoms.

## 2024-01-23 NOTE — ED PROVIDER NOTE - ATTENDING CONTRIBUTION TO CARE
89 yo female with PMHX of CAD, HTN, DM presents for evaluation of left upper chest pain radiating into into the left lateral neck region intermittently for the past 3-4 days. Patient notices pain at rest primarily and symptoms last several seconds to a minute of so. There is no pattern to the pain and patient denies any additional symptoms( exertional dyspnea/chest pain, dyspnea, nausea, vomiting, back pain, dizziness, near/syncope, limb pain/tenderness). Patient f/u with Dr. garcia( [facility]) and states recent work-up has been negative.      I, Zainab Hayden, personally saw the patient with the resident, and completed the key components of the history and physical exam. I then discussed the management plan with the resident.

## 2024-02-22 ENCOUNTER — EMERGENCY (EMERGENCY)
Facility: HOSPITAL | Age: 89
LOS: 1 days | Discharge: HOME | End: 2024-02-22
Attending: EMERGENCY MEDICINE
Payer: MEDICARE

## 2024-02-22 VITALS
RESPIRATION RATE: 20 BRPM | HEART RATE: 69 BPM | TEMPERATURE: 98 F | OXYGEN SATURATION: 98 % | SYSTOLIC BLOOD PRESSURE: 197 MMHG | DIASTOLIC BLOOD PRESSURE: 65 MMHG | WEIGHT: 121.7 LBS | HEIGHT: 60 IN

## 2024-02-22 VITALS — TEMPERATURE: 98 F

## 2024-02-22 DIAGNOSIS — Z95.1 PRESENCE OF AORTOCORONARY BYPASS GRAFT: Chronic | ICD-10-CM

## 2024-02-22 DIAGNOSIS — Z98.89 OTHER SPECIFIED POSTPROCEDURAL STATES: Chronic | ICD-10-CM

## 2024-02-22 DIAGNOSIS — Z90.710 ACQUIRED ABSENCE OF BOTH CERVIX AND UTERUS: Chronic | ICD-10-CM

## 2024-02-22 PROBLEM — M10.9 GOUT, UNSPECIFIED: Chronic | Status: ACTIVE | Noted: 2024-01-24

## 2024-02-22 LAB
ALBUMIN SERPL ELPH-MCNC: 4 G/DL — SIGNIFICANT CHANGE UP (ref 3.3–5.2)
ALP SERPL-CCNC: 70 U/L — SIGNIFICANT CHANGE UP (ref 40–120)
ALT FLD-CCNC: 10 U/L — SIGNIFICANT CHANGE UP
ANION GAP SERPL CALC-SCNC: 14 MMOL/L — SIGNIFICANT CHANGE UP (ref 5–17)
APPEARANCE UR: ABNORMAL
AST SERPL-CCNC: 17 U/L — SIGNIFICANT CHANGE UP
BACTERIA # UR AUTO: ABNORMAL /HPF
BASOPHILS # BLD AUTO: 0.07 K/UL — SIGNIFICANT CHANGE UP (ref 0–0.2)
BASOPHILS NFR BLD AUTO: 0.6 % — SIGNIFICANT CHANGE UP (ref 0–2)
BILIRUB SERPL-MCNC: 0.3 MG/DL — LOW (ref 0.4–2)
BILIRUB UR-MCNC: NEGATIVE — SIGNIFICANT CHANGE UP
BUN SERPL-MCNC: 34.3 MG/DL — HIGH (ref 8–20)
CALCIUM SERPL-MCNC: 10.3 MG/DL — SIGNIFICANT CHANGE UP (ref 8.4–10.5)
CAST: 2 /LPF — SIGNIFICANT CHANGE UP (ref 0–4)
CHLORIDE SERPL-SCNC: 101 MMOL/L — SIGNIFICANT CHANGE UP (ref 96–108)
CO2 SERPL-SCNC: 23 MMOL/L — SIGNIFICANT CHANGE UP (ref 22–29)
COLOR SPEC: YELLOW — SIGNIFICANT CHANGE UP
CREAT SERPL-MCNC: 1.47 MG/DL — HIGH (ref 0.5–1.3)
DIFF PNL FLD: ABNORMAL
EGFR: 34 ML/MIN/1.73M2 — LOW
EOSINOPHIL # BLD AUTO: 1.21 K/UL — HIGH (ref 0–0.5)
EOSINOPHIL NFR BLD AUTO: 11.1 % — HIGH (ref 0–6)
GLUCOSE SERPL-MCNC: 87 MG/DL — SIGNIFICANT CHANGE UP (ref 70–99)
GLUCOSE UR QL: NEGATIVE MG/DL — SIGNIFICANT CHANGE UP
HCT VFR BLD CALC: 39.2 % — SIGNIFICANT CHANGE UP (ref 34.5–45)
HGB BLD-MCNC: 13.1 G/DL — SIGNIFICANT CHANGE UP (ref 11.5–15.5)
IMM GRANULOCYTES NFR BLD AUTO: 0.3 % — SIGNIFICANT CHANGE UP (ref 0–0.9)
KETONES UR-MCNC: NEGATIVE MG/DL — SIGNIFICANT CHANGE UP
LEUKOCYTE ESTERASE UR-ACNC: ABNORMAL
LYMPHOCYTES # BLD AUTO: 3.86 K/UL — HIGH (ref 1–3.3)
LYMPHOCYTES # BLD AUTO: 35.4 % — SIGNIFICANT CHANGE UP (ref 13–44)
MCHC RBC-ENTMCNC: 30.3 PG — SIGNIFICANT CHANGE UP (ref 27–34)
MCHC RBC-ENTMCNC: 33.4 GM/DL — SIGNIFICANT CHANGE UP (ref 32–36)
MCV RBC AUTO: 90.7 FL — SIGNIFICANT CHANGE UP (ref 80–100)
MONOCYTES # BLD AUTO: 0.75 K/UL — SIGNIFICANT CHANGE UP (ref 0–0.9)
MONOCYTES NFR BLD AUTO: 6.9 % — SIGNIFICANT CHANGE UP (ref 2–14)
NEUTROPHILS # BLD AUTO: 4.99 K/UL — SIGNIFICANT CHANGE UP (ref 1.8–7.4)
NEUTROPHILS NFR BLD AUTO: 45.7 % — SIGNIFICANT CHANGE UP (ref 43–77)
NITRITE UR-MCNC: NEGATIVE — SIGNIFICANT CHANGE UP
PH UR: 6.5 — SIGNIFICANT CHANGE UP (ref 5–8)
PLATELET # BLD AUTO: 312 K/UL — SIGNIFICANT CHANGE UP (ref 150–400)
POTASSIUM SERPL-MCNC: 4.1 MMOL/L — SIGNIFICANT CHANGE UP (ref 3.5–5.3)
POTASSIUM SERPL-SCNC: 4.1 MMOL/L — SIGNIFICANT CHANGE UP (ref 3.5–5.3)
PROT SERPL-MCNC: 6.9 G/DL — SIGNIFICANT CHANGE UP (ref 6.6–8.7)
PROT UR-MCNC: 30 MG/DL
RBC # BLD: 4.32 M/UL — SIGNIFICANT CHANGE UP (ref 3.8–5.2)
RBC # FLD: 12.8 % — SIGNIFICANT CHANGE UP (ref 10.3–14.5)
RBC CASTS # UR COMP ASSIST: 0 /HPF — SIGNIFICANT CHANGE UP (ref 0–4)
SODIUM SERPL-SCNC: 138 MMOL/L — SIGNIFICANT CHANGE UP (ref 135–145)
SP GR SPEC: 1.01 — SIGNIFICANT CHANGE UP (ref 1–1.03)
SQUAMOUS # UR AUTO: 2 /HPF — SIGNIFICANT CHANGE UP (ref 0–5)
UROBILINOGEN FLD QL: 1 MG/DL — SIGNIFICANT CHANGE UP (ref 0.2–1)
WBC # BLD: 10.91 K/UL — HIGH (ref 3.8–10.5)
WBC # FLD AUTO: 10.91 K/UL — HIGH (ref 3.8–10.5)
WBC UR QL: 317 /HPF — HIGH (ref 0–5)

## 2024-02-22 PROCEDURE — 85025 COMPLETE CBC W/AUTO DIFF WBC: CPT

## 2024-02-22 PROCEDURE — 87186 SC STD MICRODIL/AGAR DIL: CPT

## 2024-02-22 PROCEDURE — 99285 EMERGENCY DEPT VISIT HI MDM: CPT | Mod: 25

## 2024-02-22 PROCEDURE — 70450 CT HEAD/BRAIN W/O DYE: CPT | Mod: 26,MC

## 2024-02-22 PROCEDURE — 36415 COLL VENOUS BLD VENIPUNCTURE: CPT

## 2024-02-22 PROCEDURE — 71045 X-RAY EXAM CHEST 1 VIEW: CPT

## 2024-02-22 PROCEDURE — 99285 EMERGENCY DEPT VISIT HI MDM: CPT

## 2024-02-22 PROCEDURE — 81001 URINALYSIS AUTO W/SCOPE: CPT

## 2024-02-22 PROCEDURE — 87086 URINE CULTURE/COLONY COUNT: CPT

## 2024-02-22 PROCEDURE — 80053 COMPREHEN METABOLIC PANEL: CPT

## 2024-02-22 PROCEDURE — 71045 X-RAY EXAM CHEST 1 VIEW: CPT | Mod: 26

## 2024-02-22 PROCEDURE — 93010 ELECTROCARDIOGRAM REPORT: CPT

## 2024-02-22 PROCEDURE — 70450 CT HEAD/BRAIN W/O DYE: CPT | Mod: MC

## 2024-02-22 PROCEDURE — 93005 ELECTROCARDIOGRAM TRACING: CPT

## 2024-02-22 RX ORDER — CEPHALEXIN 500 MG
500 CAPSULE ORAL ONCE
Refills: 0 | Status: COMPLETED | OUTPATIENT
Start: 2024-02-22 | End: 2024-02-22

## 2024-02-22 RX ORDER — MECLIZINE HCL 12.5 MG
25 TABLET ORAL ONCE
Refills: 0 | Status: COMPLETED | OUTPATIENT
Start: 2024-02-22 | End: 2024-02-22

## 2024-02-22 RX ORDER — SODIUM CHLORIDE 9 MG/ML
1000 INJECTION INTRAMUSCULAR; INTRAVENOUS; SUBCUTANEOUS ONCE
Refills: 0 | Status: COMPLETED | OUTPATIENT
Start: 2024-02-22 | End: 2024-02-22

## 2024-02-22 RX ORDER — CEPHALEXIN 500 MG
1 CAPSULE ORAL
Qty: 20 | Refills: 0
Start: 2024-02-22 | End: 2024-03-02

## 2024-02-22 RX ORDER — MECLIZINE HCL 12.5 MG
1 TABLET ORAL
Qty: 9 | Refills: 0
Start: 2024-02-22 | End: 2024-02-24

## 2024-02-22 RX ADMIN — Medication 25 MILLIGRAM(S): at 15:33

## 2024-02-22 RX ADMIN — Medication 500 MILLIGRAM(S): at 18:24

## 2024-02-22 RX ADMIN — SODIUM CHLORIDE 1000 MILLILITER(S): 9 INJECTION INTRAMUSCULAR; INTRAVENOUS; SUBCUTANEOUS at 15:33

## 2024-02-22 NOTE — ED PROVIDER NOTE - CLINICAL SUMMARY MEDICAL DECISION MAKING FREE TEXT BOX
89 yo F hx of gout, CAD sp cabg, htn, hld, COPD, hypothyroidism Presents with intermittent dizziness which she describes that she was spinning.  It started 2 weeks ago when she got up that all of a sudden.  Symptoms comes and goes.  Symptoms worse when she moves her head quickly and wakes down.  Yesterday she noticed bleeding in her left cornea and became concerned.  No nausea or vomiting currently.  Still feel mild dizziness.  Patient denies chest pain, no shortness of breath, no abdominal pain, no dysuria.      Patient has mild 10 to of yellow and reddish discoloration of the left lateral conjunctiva consistent with staining from subconjectival hemorrhage.  symptoms worse with movement and likely vertigo.  given symptoms persistent for 2 weeks, will get CT head.  Will get blood work, urine, IV fluids for hydration and meclizine for dizziness. 89 yo F hx of gout, CAD sp cabg, htn, hld, COPD, hypothyroidism Presents with intermittent dizziness which she describes that she was spinning.  It started 2 weeks ago when she got up that all of a sudden.  Symptoms comes and goes.  Symptoms worse when she moves her head quickly and wakes down.  Yesterday she noticed bleeding in her left cornea and became concerned.  No nausea or vomiting currently.  Still feel mild dizziness.  Patient denies chest pain, no shortness of breath, no abdominal pain, no dysuria.      Patient has mild 10 to of yellow and reddish discoloration of the left lateral conjunctiva consistent with staining from subconjectival hemorrhage.  symptoms worse with movement and likely vertigo. CT head unremarkable.  Patient given IV fluid and meclizine with improvement.  Patient found to have UTI.  Keflex ordered.  Patient was able to ambulate to the bathroom.  Patient report mild dizziness but feels comfortable going home.  Patient resides with her son who will take care of her.  Patient will call a taxi to go home.  Will send medication to pharmacy.

## 2024-02-22 NOTE — ED PROVIDER NOTE - NSFOLLOWUPINSTRUCTIONS_ED_ALL_ED_FT
Urinary Tract Infection    A urinary tract infection (UTI) is an infection of any part of the urinary tract, which includes the kidneys, ureters, bladder, and urethra. Risk factors include ignoring your need to urinate, wiping back to front if female, being an uncircumcised male, and having diabetes or a weak immune system. Symptoms include frequent urination, pain or burning with urination, foul smelling urine, cloudy urine, pain in the lower abdomen, blood in the urine, and fever. If you were prescribed an antibiotic medicine, take it as told by your health care provider. Do not stop taking the antibiotic even if you start to feel better.    SEEK IMMEDIATE MEDICAL CARE IF YOU HAVE ANY OF THE FOLLOWING SYMPTOMS: severe back or abdominal pain, fever, inability to keep fluids or medicine down, dizziness/lightheadedness, or a change in mental status.    Vertigo  Vertigo is the feeling that you or your surroundings are moving when they are not. This feeling can come and go at any time. Vertigo often goes away on its own. Vertigo can be dangerous if it occurs while you are doing something that could endanger yourself or others, such as driving or operating machinery.    Your health care provider will do tests to try to determine the cause of your vertigo. Tests will also help your health care provider decide how best to treat your condition.    Follow these instructions at home:  Eating and drinking    A comparison of three sample cups showing dark yellow, yellow, and pale yellow urine.  A sign showing that a person should not drink beer, wine, or liquor.  Dehydration can make vertigo worse. Drink enough fluid to keep your urine pale yellow.  Do not drink alcohol.  Activity    Return to your normal activities as told by your health care provider. Ask your health care provider what activities are safe for you.  In the morning, first sit up on the side of the bed. When you feel okay, stand slowly while you hold onto something until you know that your balance is fine.  Move slowly. Avoid sudden body or head movements or certain positions, as told by your health care provider.  If you have trouble walking or keeping your balance, try using a cane for stability. If you feel dizzy or unstable, sit down right away.  Avoid doing any tasks that would cause danger to you or others if vertigo occurs.  Avoid bending down if you feel dizzy. Place items in your home so that they are easy for you to reach without bending or leaning over.  Do not drive or use machinery if you feel dizzy.  General instructions    Take over-the-counter and prescription medicines only as told by your health care provider.  Keep all follow-up visits. This is important.  Contact a health care provider if:  Your medicines do not relieve your vertigo or they make it worse.  Your condition gets worse or you develop new symptoms.  You have a fever.  You develop nausea or vomiting, or if nausea gets worse.  Your family or friends notice any behavioral changes.  You have numbness or a prickling and tingling sensation in part of your body.  Get help right away if you:  Are always dizzy or you faint.  Develop severe headaches.  Develop a stiff neck.  Develop sensitivity to light.  Have difficulty moving or speaking.  Have weakness in your hands, arms, or legs.  Have changes in your hearing or vision.  These symptoms may represent a serious problem that is an emergency. Do not wait to see if the symptoms will go away. Get medical help right away. Call your local emergency services (911 in the [place] Do not drive yourself to the hospital.    Summary  Vertigo is the feeling that you or your surroundings are moving when they are not.  Your health care provider will do tests to try to determine the cause of your vertigo.  Follow instructions for home care. You may be told to avoid certain tasks, positions, or movements.  Contact a health care provider if your medicines do not relieve your symptoms, or if you have a fever, nausea, vomiting, or changes in behavior.  Get help right away if you have severe headaches or difficulty speaking, or you develop hearing or vision problems.  This information is not intended to replace advice given to you by your health care provider. Make sure you discuss any questions you have with your health care provider.

## 2024-02-22 NOTE — ED PROVIDER NOTE - PHYSICAL EXAMINATION
VITAL SIGNS: I have reviewed nursing notes and confirm.  CONSTITUTIONAL:  in no acute distress.  SKIN: Skin exam is warm and dry, no acute rash.  HEAD: Normocephalic; atraumatic.  EYES: PERRL, EOM intact; (+) yellow/red discolorations of conjunctiva on the left lateral eye.   ENT: No nasal discharge; airway clear. Throat clear.  NECK: Supple; non tender.    CARD: Regular rate and rhythm.  RESP: No wheezes,  no rales or rhonchi.   ABD:  soft; non-distended; non-tender;   EXT: Normal ROM. No clubbing, cyanosis or edema.  NEURO: Alert, oriented. Grossly unremarkable. No focal deficits.  moves all extremities,    PSYCH: Cooperative, appropriate.

## 2024-02-22 NOTE — ED PROVIDER NOTE - NS ED ROS FT
CONSTITUTIONAL - no  fever, no diaphoresis, no weight change  SKIN - no rash  HEMATOLOGIC - no bleeding, no bruising  EYES - no eye pain, no blurred vision (+) cornea decoloration   ENT - no change in hearing, no pain  RESPIRATORY - no shortness of breath, no cough  CARDIAC - no chest pain, no palpitations  GI - no abd pain, no nausea, no vomiting, no diarrhea, no constipation, no bleeding  GENITO-URINARY - no discharge, no dysuria; no hematuria,   ENDO - no polydipsia, no polyuria, no heat/no cold intolerance  MUSCULOSKELETAL - no joint pain, no swelling, no redness  NEUROLOGIC - no weakness, no headache, no anesthesia, no paresthesias  (+ )dizziness   PSYCH - no anxiety, non suicidal, non homicidal, no hallucination, no depression

## 2024-02-22 NOTE — ED ADULT TRIAGE NOTE - CHIEF COMPLAINT QUOTE
C/O dizziness when changing position for the past two weeks associated with intermittent headache. PT noticed last night discoloring to left eye with pain. Hx of HTN.

## 2024-02-22 NOTE — ED ADULT TRIAGE NOTE - PRO INTERPRETER NEED 2
Left message for patient to call back to review pre-procedure instructions.     Letter done. Will have [facility] email to patient.   Orders entered.   Will need labs prior.   COVID test not needed.   
Spoke with Grisel Johnson on 06/03/22 via phone. Education was provided to the patient based on the information in the pre-procedure letter. Patient verbalized understanding.     
English

## 2024-02-22 NOTE — ED PROVIDER NOTE - OBJECTIVE STATEMENT
89 yo F hx of gout, CAD sp cabg, htn, hld, COPD, hypothyroidism Presents with intermittent dizziness which she describes that she was spinning.  It started 2 weeks ago when she got up that all of a sudden.  Symptoms comes and goes.  Symptoms worse when she moves her head quickly and wakes down.  Yesterday she noticed bleeding in her left cornea and became concerned.  No nausea or vomiting currently.  Still feel mild dizziness.  Patient denies chest pain, no shortness of breath, no abdominal pain, no dysuria.

## 2024-02-22 NOTE — ED PROVIDER NOTE - PATIENT PORTAL LINK FT
You can access the FollowMyHealth Patient Portal offered by [facility] by registering at the following website: [email or web address]. By joining [facility]’s FollowMyHealth portal, you will also be able to view your health information using other applications (apps) compatible with our system.

## 2024-06-20 ENCOUNTER — OFFICE (OUTPATIENT)
Dept: URBAN - METROPOLITAN AREA CLINIC 12 | Facility: CLINIC | Age: 89
Setting detail: OPHTHALMOLOGY
End: 2024-06-20
Payer: MEDICARE

## 2024-06-20 DIAGNOSIS — H40.1131: ICD-10-CM

## 2024-06-20 DIAGNOSIS — H26.493: ICD-10-CM

## 2024-06-20 DIAGNOSIS — H16.223: ICD-10-CM

## 2024-06-20 DIAGNOSIS — Z96.1: ICD-10-CM

## 2024-06-20 PROCEDURE — 92020 GONIOSCOPY: CPT | Performed by: STUDENT IN AN ORGANIZED HEALTH CARE EDUCATION/TRAINING PROGRAM

## 2024-06-20 PROCEDURE — 99213 OFFICE O/P EST LOW 20 MIN: CPT | Performed by: STUDENT IN AN ORGANIZED HEALTH CARE EDUCATION/TRAINING PROGRAM

## 2024-06-20 PROCEDURE — 76514 ECHO EXAM OF EYE THICKNESS: CPT | Performed by: STUDENT IN AN ORGANIZED HEALTH CARE EDUCATION/TRAINING PROGRAM

## 2024-06-20 PROCEDURE — 92133 CPTRZD OPH DX IMG PST SGM ON: CPT | Performed by: STUDENT IN AN ORGANIZED HEALTH CARE EDUCATION/TRAINING PROGRAM

## 2024-06-20 ASSESSMENT — LID POSITION - COMMENTS
OD_COMMENTS: INCISION INTACT
OS_COMMENTS: INCISION INTACT

## 2024-06-20 ASSESSMENT — CONFRONTATIONAL VISUAL FIELD TEST (CVF)
OD_FINDINGS: FULL
OS_FINDINGS: FULL

## 2025-08-11 ENCOUNTER — EMERGENCY (EMERGENCY)
Facility: HOSPITAL | Age: 89
LOS: 1 days | End: 2025-08-11
Attending: EMERGENCY MEDICINE
Payer: MEDICARE

## 2025-08-11 VITALS
HEART RATE: 54 BPM | DIASTOLIC BLOOD PRESSURE: 84 MMHG | RESPIRATION RATE: 19 BRPM | OXYGEN SATURATION: 97 % | SYSTOLIC BLOOD PRESSURE: 203 MMHG | TEMPERATURE: 98 F

## 2025-08-11 DIAGNOSIS — Z90.710 ACQUIRED ABSENCE OF BOTH CERVIX AND UTERUS: Chronic | ICD-10-CM

## 2025-08-11 DIAGNOSIS — Z95.1 PRESENCE OF AORTOCORONARY BYPASS GRAFT: Chronic | ICD-10-CM

## 2025-08-11 DIAGNOSIS — Z98.89 OTHER SPECIFIED POSTPROCEDURAL STATES: Chronic | ICD-10-CM

## 2025-08-11 PROCEDURE — 93010 ELECTROCARDIOGRAM REPORT: CPT

## 2025-08-11 PROCEDURE — 99284 EMERGENCY DEPT VISIT MOD MDM: CPT

## 2025-08-11 PROCEDURE — 99053 MED SERV 10PM-8AM 24 HR FAC: CPT

## 2025-08-12 VITALS
OXYGEN SATURATION: 97 % | RESPIRATION RATE: 18 BRPM | TEMPERATURE: 97 F | SYSTOLIC BLOOD PRESSURE: 183 MMHG | HEART RATE: 47 BPM | DIASTOLIC BLOOD PRESSURE: 81 MMHG

## 2025-08-12 LAB
ALBUMIN SERPL ELPH-MCNC: 3.8 G/DL — SIGNIFICANT CHANGE UP (ref 3.3–5.2)
ALP SERPL-CCNC: 67 U/L — SIGNIFICANT CHANGE UP (ref 40–120)
ALT FLD-CCNC: 9 U/L — SIGNIFICANT CHANGE UP
ANION GAP SERPL CALC-SCNC: 13 MMOL/L — SIGNIFICANT CHANGE UP (ref 5–17)
AST SERPL-CCNC: 17 U/L — SIGNIFICANT CHANGE UP
BASOPHILS # BLD AUTO: 0.08 K/UL — SIGNIFICANT CHANGE UP (ref 0–0.2)
BASOPHILS NFR BLD AUTO: 1.1 % — SIGNIFICANT CHANGE UP (ref 0–2)
BILIRUB SERPL-MCNC: 0.3 MG/DL — LOW (ref 0.4–2)
BUN SERPL-MCNC: 38.4 MG/DL — HIGH (ref 8–20)
CALCIUM SERPL-MCNC: 9.9 MG/DL — SIGNIFICANT CHANGE UP (ref 8.4–10.5)
CHLORIDE SERPL-SCNC: 104 MMOL/L — SIGNIFICANT CHANGE UP (ref 96–108)
CO2 SERPL-SCNC: 22 MMOL/L — SIGNIFICANT CHANGE UP (ref 22–29)
CREAT SERPL-MCNC: 1.7 MG/DL — HIGH (ref 0.5–1.3)
EGFR: 28 ML/MIN/1.73M2 — LOW
EGFR: 28 ML/MIN/1.73M2 — LOW
EOSINOPHIL # BLD AUTO: 0.99 K/UL — HIGH (ref 0–0.5)
EOSINOPHIL NFR BLD AUTO: 13 % — HIGH (ref 0–6)
GLUCOSE SERPL-MCNC: 235 MG/DL — HIGH (ref 70–99)
HCT VFR BLD CALC: 35.4 % — SIGNIFICANT CHANGE UP (ref 34.5–45)
HGB BLD-MCNC: 11.8 G/DL — SIGNIFICANT CHANGE UP (ref 11.5–15.5)
IMM GRANULOCYTES # BLD AUTO: 0.03 K/UL — SIGNIFICANT CHANGE UP (ref 0–0.07)
IMM GRANULOCYTES NFR BLD AUTO: 0.4 % — SIGNIFICANT CHANGE UP (ref 0–0.9)
LYMPHOCYTES # BLD AUTO: 2.37 K/UL — SIGNIFICANT CHANGE UP (ref 1–3.3)
LYMPHOCYTES NFR BLD AUTO: 31.2 % — SIGNIFICANT CHANGE UP (ref 13–44)
MCHC RBC-ENTMCNC: 31.1 PG — SIGNIFICANT CHANGE UP (ref 27–34)
MCHC RBC-ENTMCNC: 33.3 G/DL — SIGNIFICANT CHANGE UP (ref 32–36)
MCV RBC AUTO: 93.2 FL — SIGNIFICANT CHANGE UP (ref 80–100)
MONOCYTES # BLD AUTO: 0.64 K/UL — SIGNIFICANT CHANGE UP (ref 0–0.9)
MONOCYTES NFR BLD AUTO: 8.4 % — SIGNIFICANT CHANGE UP (ref 2–14)
NEUTROPHILS # BLD AUTO: 3.48 K/UL — SIGNIFICANT CHANGE UP (ref 1.8–7.4)
NEUTROPHILS NFR BLD AUTO: 45.9 % — SIGNIFICANT CHANGE UP (ref 43–77)
NRBC # BLD AUTO: 0 K/UL — SIGNIFICANT CHANGE UP (ref 0–0)
NRBC # FLD: 0 K/UL — SIGNIFICANT CHANGE UP (ref 0–0)
NRBC BLD AUTO-RTO: 0 /100 WBCS — SIGNIFICANT CHANGE UP (ref 0–0)
PLATELET # BLD AUTO: 279 K/UL — SIGNIFICANT CHANGE UP (ref 150–400)
PMV BLD: 9.6 FL — SIGNIFICANT CHANGE UP (ref 7–13)
POTASSIUM SERPL-MCNC: 4.1 MMOL/L — SIGNIFICANT CHANGE UP (ref 3.5–5.3)
POTASSIUM SERPL-SCNC: 4.1 MMOL/L — SIGNIFICANT CHANGE UP (ref 3.5–5.3)
PROT SERPL-MCNC: 6.4 G/DL — LOW (ref 6.6–8.7)
RBC # BLD: 3.8 M/UL — SIGNIFICANT CHANGE UP (ref 3.8–5.2)
RBC # FLD: 13.2 % — SIGNIFICANT CHANGE UP (ref 10.3–14.5)
SODIUM SERPL-SCNC: 139 MMOL/L — SIGNIFICANT CHANGE UP (ref 135–145)
WBC # BLD: 7.59 K/UL — SIGNIFICANT CHANGE UP (ref 3.8–10.5)
WBC # FLD AUTO: 7.59 K/UL — SIGNIFICANT CHANGE UP (ref 3.8–10.5)

## 2025-08-12 PROCEDURE — 85025 COMPLETE CBC W/AUTO DIFF WBC: CPT

## 2025-08-12 PROCEDURE — 82962 GLUCOSE BLOOD TEST: CPT

## 2025-08-12 PROCEDURE — 93005 ELECTROCARDIOGRAM TRACING: CPT

## 2025-08-12 PROCEDURE — 70450 CT HEAD/BRAIN W/O DYE: CPT

## 2025-08-12 PROCEDURE — 36415 COLL VENOUS BLD VENIPUNCTURE: CPT

## 2025-08-12 PROCEDURE — 80053 COMPREHEN METABOLIC PANEL: CPT

## 2025-08-12 PROCEDURE — 99285 EMERGENCY DEPT VISIT HI MDM: CPT | Mod: 25

## 2025-08-12 PROCEDURE — 70450 CT HEAD/BRAIN W/O DYE: CPT | Mod: 26

## 2025-08-15 DIAGNOSIS — G47.53 RECURRENT ISOLATED SLEEP PARALYSIS: ICD-10-CM

## 2025-08-15 DIAGNOSIS — Z88.5 ALLERGY STATUS TO NARCOTIC AGENT: ICD-10-CM

## 2025-08-15 DIAGNOSIS — Z88.8 ALLERGY STATUS TO OTHER DRUGS, MEDICAMENTS AND BIOLOGICAL SUBSTANCES: ICD-10-CM

## 2025-08-21 ENCOUNTER — NON-APPOINTMENT (OUTPATIENT)
Age: 89
End: 2025-08-21